# Patient Record
Sex: FEMALE | NOT HISPANIC OR LATINO | URBAN - METROPOLITAN AREA
[De-identification: names, ages, dates, MRNs, and addresses within clinical notes are randomized per-mention and may not be internally consistent; named-entity substitution may affect disease eponyms.]

---

## 2019-10-25 ENCOUNTER — IMPORTED ENCOUNTER (OUTPATIENT)
Dept: URBAN - METROPOLITAN AREA CLINIC 50 | Facility: CLINIC | Age: 64
End: 2019-10-25

## 2021-04-17 ASSESSMENT — VISUAL ACUITY
OD_CC: J1+
OD_CC: 20/20-
OS_CC: 20/20-
OS_CC: J1+

## 2021-04-17 ASSESSMENT — TONOMETRY
OD_IOP_MMHG: 15
OS_IOP_MMHG: 16

## 2021-07-09 ENCOUNTER — COMPREHENSIVE EXAM (OUTPATIENT)
Dept: URBAN - METROPOLITAN AREA CLINIC 53 | Facility: CLINIC | Age: 66
End: 2021-07-09

## 2021-07-09 DIAGNOSIS — H25.13: ICD-10-CM

## 2021-07-09 DIAGNOSIS — H52.4: ICD-10-CM

## 2021-07-09 PROCEDURE — 92015 DETERMINE REFRACTIVE STATE: CPT

## 2021-07-09 PROCEDURE — 92014 COMPRE OPH EXAM EST PT 1/>: CPT

## 2021-07-09 ASSESSMENT — VISUAL ACUITY
OD_GLARE: 20/40
OU_CC: J1+
OD_CC: 20/20-
OS_CC: 20/20-
OS_GLARE: 20/25
OS_GLARE: 20/30
OD_GLARE: 20/25

## 2021-07-09 ASSESSMENT — TONOMETRY
OD_IOP_MMHG: 16
OS_IOP_MMHG: 16

## 2021-08-26 ENCOUNTER — REFRACTION RECHECK (OUTPATIENT)
Dept: URBAN - METROPOLITAN AREA CLINIC 53 | Facility: CLINIC | Age: 66
End: 2021-08-26

## 2021-08-26 DIAGNOSIS — H53.8: ICD-10-CM

## 2021-08-26 PROCEDURE — 92015NC REFRACTION NO CHARGE

## 2021-08-26 ASSESSMENT — VISUAL ACUITY
OS_CC: 20/20-2
OU_CC: J1+
OU_CC: 20/20
OD_CC: 20/20

## 2022-07-21 ENCOUNTER — COMPREHENSIVE EXAM (OUTPATIENT)
Dept: URBAN - METROPOLITAN AREA CLINIC 53 | Facility: CLINIC | Age: 67
End: 2022-07-21

## 2022-07-21 DIAGNOSIS — H52.4: ICD-10-CM

## 2022-07-21 DIAGNOSIS — H53.8: ICD-10-CM

## 2022-07-21 DIAGNOSIS — H25.813: ICD-10-CM

## 2022-07-21 PROCEDURE — 92015 DETERMINE REFRACTIVE STATE: CPT

## 2022-07-21 PROCEDURE — 92014 COMPRE OPH EXAM EST PT 1/>: CPT

## 2022-07-21 ASSESSMENT — TONOMETRY
OD_IOP_MMHG: 15
OS_IOP_MMHG: 16

## 2022-07-21 ASSESSMENT — VISUAL ACUITY
OS_CC: 20/25
OD_PH: 20/25-2
OU_CC: J1+
OU_CC: 20/20
OS_GLARE: 20/30
OD_GLARE: 20/30
OD_CC: 20/30
OD_GLARE: 20/40
OS_GLARE: 20/25

## 2023-08-17 ENCOUNTER — COMPREHENSIVE EXAM (OUTPATIENT)
Dept: URBAN - METROPOLITAN AREA CLINIC 53 | Facility: CLINIC | Age: 68
End: 2023-08-17

## 2023-08-17 DIAGNOSIS — H02.834: ICD-10-CM

## 2023-08-17 DIAGNOSIS — H02.831: ICD-10-CM

## 2023-08-17 DIAGNOSIS — H25.813: ICD-10-CM

## 2023-08-17 PROCEDURE — 92015 DETERMINE REFRACTIVE STATE: CPT

## 2023-08-17 PROCEDURE — 99214 OFFICE O/P EST MOD 30 MIN: CPT

## 2023-08-17 ASSESSMENT — VISUAL ACUITY
OS_GLARE: 20/25
OU_CC: 20/20
OD_GLARE: 20/30
OD_GLARE: 20/25
OU_CC: J1+@18IN
OD_CC: 20/25
OS_GLARE: 20/30
OS_CC: 20/25

## 2023-08-17 ASSESSMENT — TONOMETRY
OS_IOP_MMHG: 19
OD_IOP_MMHG: 17

## 2025-01-01 NOTE — PATIENT DISCUSSION
Glasses Prescription given to patient for drs. remake.  Computer specs: DVA set at [facility] 02-Jan-2025 01:49

## 2025-02-17 ENCOUNTER — COMPREHENSIVE EXAM (OUTPATIENT)
Age: 70
End: 2025-02-17

## 2025-02-17 DIAGNOSIS — H02.834: ICD-10-CM

## 2025-02-17 DIAGNOSIS — H25.813: ICD-10-CM

## 2025-02-17 DIAGNOSIS — H52.4: ICD-10-CM

## 2025-02-17 DIAGNOSIS — H02.831: ICD-10-CM

## 2025-02-17 PROCEDURE — 92015 DETERMINE REFRACTIVE STATE: CPT

## 2025-02-17 PROCEDURE — 99214 OFFICE O/P EST MOD 30 MIN: CPT
